# Patient Record
Sex: MALE | Race: WHITE | NOT HISPANIC OR LATINO
[De-identification: names, ages, dates, MRNs, and addresses within clinical notes are randomized per-mention and may not be internally consistent; named-entity substitution may affect disease eponyms.]

---

## 2018-12-27 ENCOUNTER — NON-APPOINTMENT (OUTPATIENT)
Age: 58
End: 2018-12-27

## 2018-12-27 ENCOUNTER — APPOINTMENT (OUTPATIENT)
Dept: CARDIOLOGY | Facility: CLINIC | Age: 58
End: 2018-12-27
Payer: COMMERCIAL

## 2018-12-27 VITALS
WEIGHT: 280 LBS | HEART RATE: 57 BPM | SYSTOLIC BLOOD PRESSURE: 108 MMHG | HEIGHT: 70 IN | DIASTOLIC BLOOD PRESSURE: 60 MMHG | BODY MASS INDEX: 40.09 KG/M2

## 2018-12-27 DIAGNOSIS — Q07.00 ARNOLD-CHIARI SYNDROME W/OUT SPINA BIFIDA OR HYDROCEPHALUS: ICD-10-CM

## 2018-12-27 DIAGNOSIS — Z86.79 PERSONAL HISTORY OF OTHER DISEASES OF THE CIRCULATORY SYSTEM: ICD-10-CM

## 2018-12-27 DIAGNOSIS — Z87.898 PERSONAL HISTORY OF OTHER SPECIFIED CONDITIONS: ICD-10-CM

## 2018-12-27 DIAGNOSIS — Z86.69 PERSONAL HISTORY OF OTHER DISEASES OF THE NERVOUS SYSTEM AND SENSE ORGANS: ICD-10-CM

## 2018-12-27 DIAGNOSIS — Z87.891 PERSONAL HISTORY OF NICOTINE DEPENDENCE: ICD-10-CM

## 2018-12-27 DIAGNOSIS — G62.9 POLYNEUROPATHY, UNSPECIFIED: ICD-10-CM

## 2018-12-27 PROCEDURE — 99243 OFF/OP CNSLTJ NEW/EST LOW 30: CPT

## 2018-12-27 RX ORDER — ASPIRIN 81 MG
81 TABLET, DELAYED RELEASE (ENTERIC COATED) ORAL
Refills: 0 | Status: ACTIVE | COMMUNITY

## 2018-12-27 NOTE — REVIEW OF SYSTEMS
[Dyspnea on exertion] : dyspnea during exertion [Lower Ext Edema] : lower extremity edema [Negative] : Heme/Lymph

## 2018-12-27 NOTE — DISCUSSION/SUMMARY
[FreeTextEntry1] : Kory is a 58-year-old male with medical history detailed above and active medical issues including:\par \par - Preop evaluation abdominal hernia repair Dr Patel 1/14/19 [facility].  Preop will be completed after further noninvasive testing.\par \par - Patient has dyspnea with moderate exertion, atypical chest pain, abnormal baseline EKG, multiple CAD risk factors. Patient will have noninvasive testing with a exercise Myoview stress test to assess for obstructive CAD, exercise-induced arrhythmia,  blood pressure response, 2DEcho for LVEF, wall motion, structural heart disease,  carotid and abdominal ultrasound to assess for obstructive PAD. \par \par - Dependant leg edema, unknown LVEF. Patient instructed to maintain low salt diet, elevate legs when nonambulatory, knee-high compression stockings during the day and with prolonged travel.\par \par - History of left rotator cuff surgery 2015, right shoulder bursitis pain.\par \par - Obesity. Discussed calorie reduction and increased exercise as a weight reduction strategy.\par \par - Mood disorder on Xanax, Cymbalta\par \par - History of alcoholism discontinued 1994, substance abuse cocaine discontinued 1989\par \par - Family history of stroke brother age 52\par \par - History of Arnold Chiari malformation cranial surgery 1999\par \par Patient will be seen in cardiology follow-up after noninvasive testing to complete preop clearance.\par \par Advised patient to follow active lifestyle with regular cardiovascular exercise. Patient educated on lifestyle and diet modification with low sodium low fat diet and avoidance of excessive alcohol. Patient is aware to call with any symptoms or concerns. \par \par Patient currently not on cardiac medication. Repeat labs will be ordered with PMD.\par \par Kory will followup with George FIGUEREDO for primary care\par \par

## 2018-12-27 NOTE — REASON FOR VISIT
[Consultation] : a consultation regarding [FreeTextEntry2] : preop abdominal hernia repair Dr Patel 1/14/19 [facility] [FreeTextEntry1] : Kory is a 58-year-old male with history of obesity, asthma, anxiety, alcoholism quit in 1994, substance abuse cocaine quit 1984, neuropathy, mood disorder, dependant leg edema, migraine, Arnold Chiari malformation cranial surgery 2003, rotator cuff surgery 2015, hernia surgery 2003. \par \par Patient has dyspnea with moderate exertion.  Patient has chronic dependant leg edema.  Cardiovascular review of symptoms is negative for exertional chest pain, palpitations, dizziness or syncope.  No PND or orthopnea.  No bleeding or black stool. Patient has chronic right shoulder bursitis pain increasing over the past month.  \par \par Patient is walking his dog 15 minutes with exertional chest pain.  Patient states he is physically active working as a .  No prior cardiology evaluation or stress testing. \par \par EKG December 2018, sinus rhythm, PACs, PRWP\par

## 2019-01-03 ENCOUNTER — OUTPATIENT (OUTPATIENT)
Dept: OUTPATIENT SERVICES | Facility: HOSPITAL | Age: 59
LOS: 1 days | End: 2019-01-03

## 2019-01-07 ENCOUNTER — APPOINTMENT (OUTPATIENT)
Dept: CARDIOLOGY | Facility: CLINIC | Age: 59
End: 2019-01-07
Payer: COMMERCIAL

## 2019-01-07 PROCEDURE — 93880 EXTRACRANIAL BILAT STUDY: CPT

## 2019-01-07 PROCEDURE — 93306 TTE W/DOPPLER COMPLETE: CPT

## 2019-01-07 PROCEDURE — 93979 VASCULAR STUDY: CPT

## 2019-01-08 ENCOUNTER — APPOINTMENT (OUTPATIENT)
Dept: CARDIOLOGY | Facility: CLINIC | Age: 59
End: 2019-01-08
Payer: COMMERCIAL

## 2019-01-08 DIAGNOSIS — Z82.0 FAMILY HISTORY OF EPILEPSY AND OTHER DISEASES OF THE NERVOUS SYSTEM: ICD-10-CM

## 2019-01-08 DIAGNOSIS — Z87.898 PERSONAL HISTORY OF OTHER SPECIFIED CONDITIONS: ICD-10-CM

## 2019-01-08 DIAGNOSIS — Z81.8 FAMILY HISTORY OF OTHER MENTAL AND BEHAVIORAL DISORDERS: ICD-10-CM

## 2019-01-08 DIAGNOSIS — Z82.5 FAMILY HISTORY OF ASTHMA AND OTHER CHRONIC LOWER RESPIRATORY DISEASES: ICD-10-CM

## 2019-01-08 DIAGNOSIS — K21.9 GASTRO-ESOPHAGEAL REFLUX DISEASE W/OUT ESOPHAGITIS: ICD-10-CM

## 2019-01-08 PROCEDURE — A9502: CPT

## 2019-01-08 PROCEDURE — 93015 CV STRESS TEST SUPVJ I&R: CPT

## 2019-01-08 PROCEDURE — 78452 HT MUSCLE IMAGE SPECT MULT: CPT

## 2019-01-08 RX ORDER — ASCORBIC ACID 500 MG
500 TABLET ORAL
Refills: 0 | Status: ACTIVE | COMMUNITY

## 2019-01-08 RX ORDER — OMEPRAZOLE 20 MG/1
20 CAPSULE, DELAYED RELEASE ORAL
Refills: 0 | Status: DISCONTINUED | COMMUNITY
End: 2019-01-08

## 2019-01-09 ENCOUNTER — APPOINTMENT (OUTPATIENT)
Dept: CARDIOLOGY | Facility: CLINIC | Age: 59
End: 2019-01-09
Payer: COMMERCIAL

## 2019-01-09 VITALS
DIASTOLIC BLOOD PRESSURE: 70 MMHG | BODY MASS INDEX: 40.09 KG/M2 | SYSTOLIC BLOOD PRESSURE: 120 MMHG | HEIGHT: 70 IN | OXYGEN SATURATION: 98 % | WEIGHT: 280 LBS | HEART RATE: 64 BPM

## 2019-01-09 DIAGNOSIS — Z01.812 ENCOUNTER FOR PREPROCEDURAL LABORATORY EXAMINATION: ICD-10-CM

## 2019-01-09 PROCEDURE — 99215 OFFICE O/P EST HI 40 MIN: CPT

## 2019-01-09 PROCEDURE — 93224 XTRNL ECG REC UP TO 48 HRS: CPT

## 2019-01-10 ENCOUNTER — OUTPATIENT (OUTPATIENT)
Dept: OUTPATIENT SERVICES | Facility: HOSPITAL | Age: 59
LOS: 1 days | End: 2019-01-10

## 2019-01-15 ENCOUNTER — OUTPATIENT (OUTPATIENT)
Dept: OUTPATIENT SERVICES | Facility: HOSPITAL | Age: 59
LOS: 1 days | End: 2019-01-15
Payer: COMMERCIAL

## 2019-01-15 PROCEDURE — 93458 L HRT ARTERY/VENTRICLE ANGIO: CPT | Mod: 26

## 2019-01-17 ENCOUNTER — APPOINTMENT (OUTPATIENT)
Dept: CARDIOLOGY | Facility: CLINIC | Age: 59
End: 2019-01-17
Payer: COMMERCIAL

## 2019-01-17 VITALS
BODY MASS INDEX: 39.8 KG/M2 | SYSTOLIC BLOOD PRESSURE: 110 MMHG | HEIGHT: 70 IN | HEART RATE: 61 BPM | OXYGEN SATURATION: 96 % | WEIGHT: 278 LBS | DIASTOLIC BLOOD PRESSURE: 68 MMHG

## 2019-01-17 PROCEDURE — 99214 OFFICE O/P EST MOD 30 MIN: CPT

## 2019-02-11 ENCOUNTER — OUTPATIENT (OUTPATIENT)
Dept: OUTPATIENT SERVICES | Facility: HOSPITAL | Age: 59
LOS: 1 days | End: 2019-02-11

## 2019-07-16 ENCOUNTER — APPOINTMENT (OUTPATIENT)
Dept: CARDIOLOGY | Facility: CLINIC | Age: 59
End: 2019-07-16
Payer: COMMERCIAL

## 2019-07-16 ENCOUNTER — NON-APPOINTMENT (OUTPATIENT)
Age: 59
End: 2019-07-16

## 2019-07-16 VITALS
WEIGHT: 270 LBS | HEIGHT: 71 IN | OXYGEN SATURATION: 98 % | DIASTOLIC BLOOD PRESSURE: 66 MMHG | SYSTOLIC BLOOD PRESSURE: 112 MMHG | BODY MASS INDEX: 37.8 KG/M2 | HEART RATE: 70 BPM

## 2019-07-16 PROCEDURE — 99214 OFFICE O/P EST MOD 30 MIN: CPT

## 2019-07-16 PROCEDURE — 93000 ELECTROCARDIOGRAM COMPLETE: CPT

## 2019-07-16 NOTE — DISCUSSION/SUMMARY
[FreeTextEntry1] : Kory is a 58-year-old male with medical history detailed above and active medical issues including:\par \par - No anginal symptoms, abnormal Myoview stress test, nonobstructive catheterization January 2019\par \par - Asymptomatic atrial tachycardia no AFL seen on Holter monitor January 2019.  Repeat Holter applied today.  Medication noncompliance, restart Toprol today in followup home BP prior to dose.\par \par - Dependant leg edema, unknown LVEF. Patient instructed to maintain low salt diet, elevate legs when nonambulatory, knee-high compression stockings during the day and with prolonged travel.\par \par - History of left rotator cuff surgery 2015, right shoulder bursitis pain.\par \par - Obesity. Discussed calorie reduction and increased exercise as a weight reduction strategy.\par \par - Mood disorder on Xanax, Cymbalta\par \par - History of alcoholism discontinued 1994, substance abuse cocaine discontinued 1989\par \par - Family history of stroke brother age 52\par \par - History of Arnold Chiari malformation cranial surgery 1999\par \par Patient will be seen in cardiology follow-up 3 months. \par \par Advised patient to follow active lifestyle with regular cardiovascular exercise. Patient educated on lifestyle and diet modification with low sodium low fat diet and avoidance of excessive alcohol. Patient is aware to call with any symptoms or concerns. \par \par Kory will followup with George FIGUEREDO for primary care\par \par

## 2019-07-16 NOTE — PHYSICAL EXAM
[General Appearance - Well Developed] : well developed [Normal Appearance] : normal appearance [Well Groomed] : well groomed [General Appearance - Well Nourished] : well nourished [No Deformities] : no deformities [General Appearance - In No Acute Distress] : no acute distress [FreeTextEntry1] : pleasant moderately obese middle-aged male [Normal Conjunctiva] : the conjunctiva exhibited no abnormalities [Eyelids - No Xanthelasma] : the eyelids demonstrated no xanthelasmas [Normal Oral Mucosa] : normal oral mucosa [No Oral Pallor] : no oral pallor [No Oral Cyanosis] : no oral cyanosis [Normal Jugular Venous A Waves Present] : normal jugular venous A waves present [Normal Jugular Venous V Waves Present] : normal jugular venous V waves present [No Jugular Venous Kuo A Waves] : no jugular venous kuo A waves [Respiration, Rhythm And Depth] : normal respiratory rhythm and effort [Exaggerated Use Of Accessory Muscles For Inspiration] : no accessory muscle use [Auscultation Breath Sounds / Voice Sounds] : lungs were clear to auscultation bilaterally [Heart Rate And Rhythm] : heart rate and rhythm were normal [Heart Sounds] : normal S1 and S2 [Murmurs] : no murmurs present [Abdomen Soft] : soft [Abdomen Tenderness] : non-tender [Abdomen Mass (___ Cm)] : no abdominal mass palpated [Abnormal Walk] : normal gait [Gait - Sufficient For Exercise Testing] : the gait was sufficient for exercise testing [Nail Clubbing] : no clubbing of the fingernails [Cyanosis, Localized] : no localized cyanosis [Petechial Hemorrhages (___cm)] : no petechial hemorrhages [Skin Color & Pigmentation] : normal skin color and pigmentation [] : no rash [No Venous Stasis] : no venous stasis [Skin Lesions] : no skin lesions [No Skin Ulcers] : no skin ulcer [No Xanthoma] : no  xanthoma was observed [Oriented To Time, Place, And Person] : oriented to person, place, and time [Affect] : the affect was normal [Mood] : the mood was normal [No Anxiety] : not feeling anxious

## 2019-07-16 NOTE — REASON FOR VISIT
[Consultation] : a consultation regarding [FreeTextEntry2] : asymptomatic atrial tachycardia, abnormal myoview stess test with nonobstructive cath Jan 2019  [FreeTextEntry1] : Kory is a 58-year-old male with history of obesity, asthma, anxiety, alcoholism quit in 1994, substance abuse cocaine quit 1984, neuropathy, mood disorder, dependant leg edema, migraine, Arnold Chiari malformation cranial surgery 2003, rotator cuff surgery 2015, hernia surgery 2003. \par \par Patient states he ran out of Toprol several days ago.  Stressed the importance of medication compliance.  Patient will restart Toprol and check home BP prior to dose. \par \par Patient has occasional palpitations racing heartbeat once or twice per month. Patient has dyspnea with moderate exertion.  Patient has chronic dependant leg edema.  Cardiovascular review of symptoms is negative for exertional chest pain, dizziness or syncope.  No PND or orthopnea.  No bleeding or black stool. Patient has chronic right shoulder bursitis pain increasing over the past month.  \par \par Patient is walking his dog 15 minutes with exertional chest pain.  Patient states he is physically active working as a .  No prior cardiology evaluation or stress testing. \par \par Cardiac catheterization January 2019, nonobstructive coronaries, no left ventriculogram\par \par Lexascan Myoview stress test January 2019, LVEF 50%, mild to moderate apical ischemic defect, transient ischemic LV dilatation seen, new a flutter/tachycardia at baseline. \par \par Holter monitor January 2019, sinus rhythm  average heart rate 67bpm, rare PACs and PVCs, atrial tachycardia lasting 15 minutes, no symptoms\par \par Echocardiogram January 2019, LVEF 65%, mild diastolic dysfunction, normal PASP, aneurysmal interatrial septum\par \par Carotid and abdominal ultrasound January 2019, nonobstructive torturous carotid arteries, normal abdominal aortic size\par \par EKG December 2018, sinus rhythm, PACs, PRWP\par

## 2019-07-18 PROCEDURE — 93224 XTRNL ECG REC UP TO 48 HRS: CPT

## 2019-09-03 ENCOUNTER — MEDICATION RENEWAL (OUTPATIENT)
Age: 59
End: 2019-09-03

## 2019-10-01 ENCOUNTER — APPOINTMENT (OUTPATIENT)
Dept: CARDIOLOGY | Facility: CLINIC | Age: 59
End: 2019-10-01
Payer: COMMERCIAL

## 2019-10-01 VITALS
SYSTOLIC BLOOD PRESSURE: 130 MMHG | BODY MASS INDEX: 37.8 KG/M2 | HEART RATE: 62 BPM | HEIGHT: 71 IN | OXYGEN SATURATION: 93 % | DIASTOLIC BLOOD PRESSURE: 70 MMHG | WEIGHT: 270 LBS

## 2019-10-01 PROCEDURE — 99214 OFFICE O/P EST MOD 30 MIN: CPT

## 2019-10-01 NOTE — PHYSICAL EXAM
[General Appearance - Well Developed] : well developed [Normal Appearance] : normal appearance [General Appearance - Well Nourished] : well nourished [Well Groomed] : well groomed [General Appearance - In No Acute Distress] : no acute distress [No Deformities] : no deformities [Normal Conjunctiva] : the conjunctiva exhibited no abnormalities [Eyelids - No Xanthelasma] : the eyelids demonstrated no xanthelasmas [Normal Oral Mucosa] : normal oral mucosa [No Oral Pallor] : no oral pallor [No Oral Cyanosis] : no oral cyanosis [Normal Jugular Venous A Waves Present] : normal jugular venous A waves present [Normal Jugular Venous V Waves Present] : normal jugular venous V waves present [No Jugular Venous Kuo A Waves] : no jugular venous kuo A waves [Respiration, Rhythm And Depth] : normal respiratory rhythm and effort [Exaggerated Use Of Accessory Muscles For Inspiration] : no accessory muscle use [Auscultation Breath Sounds / Voice Sounds] : lungs were clear to auscultation bilaterally [Heart Rate And Rhythm] : heart rate and rhythm were normal [Heart Sounds] : normal S1 and S2 [Murmurs] : no murmurs present [Abdomen Soft] : soft [Abdomen Tenderness] : non-tender [Abdomen Mass (___ Cm)] : no abdominal mass palpated [Abnormal Walk] : normal gait [Gait - Sufficient For Exercise Testing] : the gait was sufficient for exercise testing [Cyanosis, Localized] : no localized cyanosis [Nail Clubbing] : no clubbing of the fingernails [Petechial Hemorrhages (___cm)] : no petechial hemorrhages [] : no rash [Skin Color & Pigmentation] : normal skin color and pigmentation [No Venous Stasis] : no venous stasis [Skin Lesions] : no skin lesions [No Skin Ulcers] : no skin ulcer [No Xanthoma] : no  xanthoma was observed [Oriented To Time, Place, And Person] : oriented to person, place, and time [Affect] : the affect was normal [Mood] : the mood was normal [No Anxiety] : not feeling anxious [FreeTextEntry1] : pleasant moderately obese middle-aged male

## 2019-10-01 NOTE — REASON FOR VISIT
[Consultation] : a consultation regarding [FreeTextEntry2] : asymptomatic atrial tachycardia, abnormal myoview stess test with nonobstructive cath Jan 2019  [FreeTextEntry1] : Kory is a 58-year-old male with history of obesity, asthma, anxiety, alcoholism quit in 1994, substance abuse cocaine quit 1984, neuropathy, mood disorder, dependant leg edema, migraine, Arnold Chiari malformation cranial surgery 2003, rotator cuff surgery 2015, hernia surgery 2003. \par \par Patient states he ran out of Toprol several days ago.  Stressed the importance of medication compliance.  Patient will restart Toprol and check home BP prior to dose. \par \par Patient has occasional palpitations racing heartbeat once or twice per month. Patient has dyspnea with moderate exertion.  Patient has chronic dependant leg edema.  Cardiovascular review of symptoms is negative for exertional chest pain, dizziness or syncope.  No PND or orthopnea.  No bleeding or black stool. Patient has chronic right shoulder bursitis pain increasing over the past month.  \par \par Patient has chest wall tightness, muscle contraction associated with emotional stress relieved with deep inspiration. \par \par Patient is walking his dog 15 minutes with exertional chest pain.  Patient states he is physically active working as a .  No prior cardiology evaluation or stress testing. \par \par Cardiac catheterization January 2019, nonobstructive coronaries, no left ventriculogram\par \par Lexascan Myoview stress test January 2019, LVEF 50%, mild to moderate apical ischemic defect, transient ischemic LV dilatation seen, new a flutter/tachycardia at baseline. \par \par Holter monitor January 2019, sinus rhythm  average heart rate 67bpm, rare PACs and PVCs, atrial tachycardia lasting 15 minutes, no symptoms\par \par Echocardiogram January 2019, LVEF 65%, mild diastolic dysfunction, normal PASP, aneurysmal interatrial septum\par \par Carotid and abdominal ultrasound January 2019, nonobstructive torturous carotid arteries, normal abdominal aortic size\par \par EKG December 2018, sinus rhythm, PACs, PRWP\par

## 2019-10-01 NOTE — DISCUSSION/SUMMARY
[FreeTextEntry1] : Kory is a 58-year-old male with medical history detailed above and active medical issues including:\par \par - No anginal symptoms, abnormal Myoview stress test, nonobstructive catheterization January 2019\par \par - Patient has chest wall tightness, muscle contraction associated with emotional stress relieved with deep inspiration. \par \par - Asymptomatic atrial tachycardia no AFL seen on Holter monitor January 2019.  Repeat Holter applied today.  Medication noncompliance, restart Toprol today in followup home BP prior to dose.\par \par - Dependant leg edema, unknown LVEF. Patient instructed to maintain low salt diet, elevate legs when nonambulatory, knee-high compression stockings during the day and with prolonged travel.\par \par - History of left rotator cuff surgery 2015, right shoulder bursitis pain.\par \par - Obesity. Discussed calorie reduction and increased exercise as a weight reduction strategy.\par \par - Mood disorder on Xanax, Cymbalta\par \par - History of alcoholism discontinued 1994, substance abuse cocaine discontinued 1989\par \par - Family history of stroke brother age 52\par \par - History of Arnold Chiari malformation cranial surgery 1999\par \par Patient will be seen in cardiology follow-up 3 months. \par \par Advised patient to follow active lifestyle with regular cardiovascular exercise. Patient educated on lifestyle and diet modification with low sodium low fat diet and avoidance of excessive alcohol. Patient is aware to call with any symptoms or concerns. \par \par Kory will followup with George FIGUEREDO for primary care\par \par

## 2019-10-01 NOTE — REVIEW OF SYSTEMS
[Negative] : Heme/Lymph [Dyspnea on exertion] : not dyspnea during exertion [Muscle Cramps] : muscle cramps [Lower Ext Edema] : no extremity edema

## 2020-01-07 ENCOUNTER — RX RENEWAL (OUTPATIENT)
Age: 60
End: 2020-01-07

## 2020-04-16 ENCOUNTER — APPOINTMENT (OUTPATIENT)
Dept: CARDIOLOGY | Facility: CLINIC | Age: 60
End: 2020-04-16

## 2020-07-01 ENCOUNTER — RX RENEWAL (OUTPATIENT)
Age: 60
End: 2020-07-01

## 2020-11-02 ENCOUNTER — RX RENEWAL (OUTPATIENT)
Age: 60
End: 2020-11-02

## 2021-02-11 ENCOUNTER — APPOINTMENT (OUTPATIENT)
Dept: CARDIOLOGY | Facility: CLINIC | Age: 61
End: 2021-02-11
Payer: COMMERCIAL

## 2021-02-11 VITALS
DIASTOLIC BLOOD PRESSURE: 62 MMHG | OXYGEN SATURATION: 95 % | HEIGHT: 71 IN | SYSTOLIC BLOOD PRESSURE: 118 MMHG | WEIGHT: 280 LBS | BODY MASS INDEX: 39.2 KG/M2 | HEART RATE: 58 BPM

## 2021-02-11 DIAGNOSIS — E66.8 OTHER OBESITY: ICD-10-CM

## 2021-02-11 PROCEDURE — 99072 ADDL SUPL MATRL&STAF TM PHE: CPT

## 2021-02-11 PROCEDURE — 93246 EXT ECG>7D<15D RECORDING: CPT

## 2021-02-11 PROCEDURE — 99214 OFFICE O/P EST MOD 30 MIN: CPT

## 2021-02-11 NOTE — ASSESSMENT
[FreeTextEntry1] : Recurrent palpitations, dyspnea on exertion, multiple CAD risk factors.  Patient will have noninvasive testing with exercise stress echo to assess for obstructive CAD, HR and BP response, exercise-induced arrhythmia, echocardiogram for LVEF, structural heart disease, carotid and abdominal ultrasound to assess for obstructive PAD.  Zio patch 2-week heart monitor started today.  Labs ordered.\par \par - Abnormal Myoview stress test with nonobstructive catheterization January 2019\par \par -Dependent leg edema\par \par -Obesity.  Discussed calorie reduction and increased exercise as a weight reduction strategy.\par \par -Remote history of substance abuse.  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 \par \par Advised patient to follow active lifestyle with regular cardiovascular exercise. Patient educated on lifestyle and diet modification with low sodium low fat diet and avoidance of excessive alcohol. Patient is aware to call with any symptoms or concerns.\par \par Patient will be seen in cardiology follow-up after noninvasive testing.\par \par Kory  follow-up will follow up with George Zhou NP for primary care

## 2021-02-11 NOTE — REVIEW OF SYSTEMS
[Muscle Cramps] : muscle cramps [Negative] : Heme/Lymph [Dyspnea on exertion] : not dyspnea during exertion [Lower Ext Edema] : no extremity edema

## 2021-02-11 NOTE — PHYSICAL EXAM
[General Appearance - Well Developed] : well developed [Normal Appearance] : normal appearance [Well Groomed] : well groomed [General Appearance - Well Nourished] : well nourished [No Deformities] : no deformities [General Appearance - In No Acute Distress] : no acute distress [Normal Conjunctiva] : the conjunctiva exhibited no abnormalities [Eyelids - No Xanthelasma] : the eyelids demonstrated no xanthelasmas [Normal Oral Mucosa] : normal oral mucosa [No Oral Pallor] : no oral pallor [No Oral Cyanosis] : no oral cyanosis [Normal Jugular Venous A Waves Present] : normal jugular venous A waves present [Normal Jugular Venous V Waves Present] : normal jugular venous V waves present [No Jugular Venous Kuo A Waves] : no jugular venous kuo A waves [Respiration, Rhythm And Depth] : normal respiratory rhythm and effort [Exaggerated Use Of Accessory Muscles For Inspiration] : no accessory muscle use [Auscultation Breath Sounds / Voice Sounds] : lungs were clear to auscultation bilaterally [Heart Rate And Rhythm] : heart rate and rhythm were normal [Heart Sounds] : normal S1 and S2 [Murmurs] : no murmurs present [Abdomen Soft] : soft [Abdomen Tenderness] : non-tender [Abdomen Mass (___ Cm)] : no abdominal mass palpated [Abnormal Walk] : normal gait [Gait - Sufficient For Exercise Testing] : the gait was sufficient for exercise testing [Nail Clubbing] : no clubbing of the fingernails [Cyanosis, Localized] : no localized cyanosis [Petechial Hemorrhages (___cm)] : no petechial hemorrhages [Skin Color & Pigmentation] : normal skin color and pigmentation [] : no rash [No Venous Stasis] : no venous stasis [Skin Lesions] : no skin lesions [No Skin Ulcers] : no skin ulcer [No Xanthoma] : no  xanthoma was observed [Oriented To Time, Place, And Person] : oriented to person, place, and time [Affect] : the affect was normal [Mood] : the mood was normal [No Anxiety] : not feeling anxious [FreeTextEntry1] : pleasant moderately obese middle-aged male

## 2021-02-11 NOTE — REASON FOR VISIT
[Follow-Up - Clinic] : a clinic follow-up of [FreeTextEntry2] : palpitations,  atrial tachycardia, abnormal myoview stess test with nonobstructive cath Jan 2019  [FreeTextEntry1] : Kory is a 60-year-old male with history of obesity, asthma, anxiety, alcoholism quit in 1994, substance abuse cocaine quit 1984, neuropathy, mood disorder, dependant leg edema, migraine, Arnold Chiari malformation cranial surgery 2003, rotator cuff surgery 2015, hernia surgery 2003.  \par \par Patient has recurrent palpitations racing heartbeat once or twice per week. Patient has dyspnea with moderate exertion.  Patient has chronic dependant leg edema.  Cardiovascular review of symptoms is negative for exertional chest pain, dizziness or syncope.  No PND or orthopnea.  No bleeding or black stool. Patient has chronic right shoulder bursitis pain increasing over the past month.  \par \par Patient has chest wall tightness, muscle contraction associated with emotional stress relieved with deep inspiration. \par \par No exercise routine.  Patient is walking his dog 15 minutes.  \par \par Cardiac catheterization January 2019, nonobstructive coronaries, no left ventriculogram\par \par Lexascan Myoview stress test January 2019, LVEF 50%, mild to moderate apical ischemic defect, transient ischemic LV dilatation seen, new a flutter/tachycardia at baseline. \par \par Holter monitor January 2019, sinus rhythm  average heart rate 67bpm, rare PACs and PVCs, atrial tachycardia lasting 15 minutes, no symptoms\par \par Echocardiogram January 2019, LVEF 65%, mild diastolic dysfunction, normal PASP, aneurysmal interatrial septum\par \par Carotid and abdominal ultrasound January 2019, nonobstructive torturous carotid arteries, normal abdominal aortic size\par \par EKG December 2018, sinus rhythm, PACs, PRWP\par

## 2021-02-26 ENCOUNTER — APPOINTMENT (OUTPATIENT)
Dept: CARDIOLOGY | Facility: CLINIC | Age: 61
End: 2021-02-26
Payer: COMMERCIAL

## 2021-02-26 PROCEDURE — 93880 EXTRACRANIAL BILAT STUDY: CPT

## 2021-02-26 PROCEDURE — 93306 TTE W/DOPPLER COMPLETE: CPT

## 2021-02-26 PROCEDURE — 93979 VASCULAR STUDY: CPT

## 2021-02-26 PROCEDURE — 99072 ADDL SUPL MATRL&STAF TM PHE: CPT

## 2021-03-09 ENCOUNTER — NON-APPOINTMENT (OUTPATIENT)
Age: 61
End: 2021-03-09

## 2021-03-09 PROCEDURE — 93248 EXT ECG>7D<15D REV&INTERPJ: CPT

## 2021-03-09 PROCEDURE — 99072 ADDL SUPL MATRL&STAF TM PHE: CPT

## 2021-03-12 ENCOUNTER — NON-APPOINTMENT (OUTPATIENT)
Age: 61
End: 2021-03-12

## 2021-03-12 ENCOUNTER — APPOINTMENT (OUTPATIENT)
Dept: ELECTROPHYSIOLOGY | Facility: CLINIC | Age: 61
End: 2021-03-12
Payer: COMMERCIAL

## 2021-03-12 VITALS
HEIGHT: 71 IN | OXYGEN SATURATION: 98 % | SYSTOLIC BLOOD PRESSURE: 122 MMHG | TEMPERATURE: 98.2 F | HEART RATE: 66 BPM | BODY MASS INDEX: 40.46 KG/M2 | WEIGHT: 289 LBS | DIASTOLIC BLOOD PRESSURE: 64 MMHG

## 2021-03-12 PROCEDURE — 99203 OFFICE O/P NEW LOW 30 MIN: CPT | Mod: 25

## 2021-03-12 PROCEDURE — 93000 ELECTROCARDIOGRAM COMPLETE: CPT

## 2021-03-12 PROCEDURE — 99072 ADDL SUPL MATRL&STAF TM PHE: CPT

## 2021-03-12 RX ORDER — DULOXETINE HYDROCHLORIDE 30 MG/1
30 CAPSULE, DELAYED RELEASE ORAL
Refills: 0 | Status: DISCONTINUED | COMMUNITY
End: 2021-03-12

## 2021-03-23 NOTE — DISCUSSION/SUMMARY
[FreeTextEntry1] : The patient has had a long history of palpitations.  His Zio patch monitor shows PVCs, short runs of NSVT and disorganized atrial rhythm atrial tach/fib.\par PVCs: different morphologies - Right bundle superior axis\par NSVT: 2 episodes 4 beat 160 bpm: Echo EF 60%. Nonobstructive coronaries in 2019. Recommend Beta blocker therapy.\par \par SVT: Appears to a disorganized AT - can degenerate to AF.  [id number] score 0. \par \par Unclear if his symptoms related to the SVT or PVCs/NSVT - but more like related to the SVT/AT - ? multifocal\par \par Treatment options discussed: catheter ablation of AT vs medications. He does not want ablation now and would recommend increase metoprolol to bid. If on the higher dosage and after lifestyle adjustments he still has recurrent symptoms will then re discuss catheter ablation. \par

## 2021-03-23 NOTE — PHYSICAL EXAM
[Normal Conjunctiva] : the conjunctiva exhibited no abnormalities [Normal Jugular Venous V Waves Present] : normal jugular venous V waves present [Heart Rate And Rhythm] : heart rate and rhythm were normal [Heart Sounds] : normal S1 and S2 [Murmurs] : no murmurs present [Arterial Pulses Normal] : the arterial pulses were normal [Edema] : no peripheral edema present [Auscultation Breath Sounds / Voice Sounds] : lungs were clear to auscultation bilaterally [Abdomen Soft] : soft [Abdomen Tenderness] : non-tender [Abnormal Walk] : normal gait [FreeTextEntry1] : trace edema [No Venous Stasis] : no venous stasis [Impaired Insight] : insight and judgment were intact

## 2021-03-23 NOTE — REVIEW OF SYSTEMS
[Feeling Fatigued] : not feeling fatigued [Shortness Of Breath] : no shortness of breath [Chest Pain] : no chest pain [Palpitations] : palpitations [Cough] : no cough [Abdominal Pain] : no abdominal pain [Dizziness] : no dizziness [Easy Bleeding] : no tendency for easy bleeding [Easy Bruising] : no tendency for easy bruising

## 2021-03-23 NOTE — HISTORY OF PRESENT ILLNESS
[FreeTextEntry1] : Patient is a 58-year-old man who was seen in evaluation for palpitations.  He has had the symptoms for a few years.  The episodes last a few minutes.  It sometimes associated with tightness in chest or difficulty with deep breathing.  There are no clear trigger to these episodes.  He does not consume much limited caffeine or alcohol.  He is a recovering substance abuser.\par \par He had a Zio patch recorder placed between 2/11/2021 and 2/25/2021: It showed sinus rhythm with heart rates ranging between 46 to 176 bpm.  He had 2 runs of NSVT 4 beats in duration.  Patient had short episodes of SVT (atrial tachycardia.  The longest episode was 1 minute 33 seconds.\par \par Echocardiogram performed on 2/26/2021 showed EF 60%, mild mitral regurgitation, sclerotic aortic valve leaflet with normal opening, left atrial diameter 4.2 cm, left atrial volume index 25 cc/m², atrial septal aneurysm, moderate concentric left ventricular hypertrophy no pericardial effusion and normal pulmonary pressures.\par \par Previous nuclear stress test performed January 2019 did not show ischemia.  Nonobstructive coronaries in 2019.\par \par He has a   [id number] score 0.\par \par No HTN, DM, thyroid disease. \par Denies alcohol, excessive caffeine, nicotine. \par \par \par \par \par \par

## 2021-03-25 ENCOUNTER — APPOINTMENT (OUTPATIENT)
Dept: CARDIOLOGY | Facility: CLINIC | Age: 61
End: 2021-03-25

## 2021-04-19 ENCOUNTER — APPOINTMENT (OUTPATIENT)
Dept: ELECTROPHYSIOLOGY | Facility: CLINIC | Age: 61
End: 2021-04-19

## 2021-04-19 ENCOUNTER — RX CHANGE (OUTPATIENT)
Age: 61
End: 2021-04-19

## 2021-04-29 ENCOUNTER — APPOINTMENT (OUTPATIENT)
Dept: CARDIOLOGY | Facility: CLINIC | Age: 61
End: 2021-04-29
Payer: COMMERCIAL

## 2021-04-29 PROCEDURE — 93351 STRESS TTE COMPLETE: CPT

## 2021-04-29 PROCEDURE — 99072 ADDL SUPL MATRL&STAF TM PHE: CPT

## 2021-04-30 ENCOUNTER — APPOINTMENT (OUTPATIENT)
Dept: CARDIOLOGY | Facility: CLINIC | Age: 61
End: 2021-04-30

## 2021-05-12 ENCOUNTER — OUTPATIENT (OUTPATIENT)
Dept: INPATIENT UNIT | Facility: HOSPITAL | Age: 61
LOS: 1 days | End: 2021-05-12
Payer: COMMERCIAL

## 2021-05-12 PROCEDURE — 93010 ELECTROCARDIOGRAM REPORT: CPT

## 2021-05-12 PROCEDURE — 99245 OFF/OP CONSLTJ NEW/EST HI 55: CPT | Mod: 25

## 2021-05-12 PROCEDURE — 93458 L HRT ARTERY/VENTRICLE ANGIO: CPT | Mod: 26

## 2021-05-18 ENCOUNTER — APPOINTMENT (OUTPATIENT)
Dept: CARDIOLOGY | Facility: CLINIC | Age: 61
End: 2021-05-18
Payer: COMMERCIAL

## 2021-05-18 VITALS
BODY MASS INDEX: 40.6 KG/M2 | SYSTOLIC BLOOD PRESSURE: 130 MMHG | DIASTOLIC BLOOD PRESSURE: 72 MMHG | OXYGEN SATURATION: 95 % | WEIGHT: 290 LBS | TEMPERATURE: 97.5 F | HEART RATE: 61 BPM | HEIGHT: 71 IN

## 2021-05-18 DIAGNOSIS — Z00.00 ENCOUNTER FOR GENERAL ADULT MEDICAL EXAMINATION W/OUT ABNORMAL FINDINGS: ICD-10-CM

## 2021-05-18 PROCEDURE — 99072 ADDL SUPL MATRL&STAF TM PHE: CPT

## 2021-05-18 PROCEDURE — 99214 OFFICE O/P EST MOD 30 MIN: CPT

## 2021-05-18 NOTE — DISCUSSION/SUMMARY
[FreeTextEntry1] : The patient has had a long history of palpitations.  His Zio patch monitor shows PVCs, short runs of NSVT and disorganized atrial rhythm atrial tach/fib.\par PVCs: different morphologies - Right bundle superior axis\par NSVT: 2 episodes 4 beat 160 bpm: Echo EF 60%. Nonobstructive coronaries in 2019. Recommend Beta blocker therapy.\par \par SVT: Appears to a disorganized AT - can degenerate to AF.  [id number] score 0. \par \par Unclear if his symptoms related to the SVT or PVCs/NSVT - but more like related to the SVT/AT - ? multifocal\par \par Treatment options discussed: catheter ablation of AT vs medications.

## 2021-05-18 NOTE — PHYSICAL EXAM
[Normal Conjunctiva] : the conjunctiva exhibited no abnormalities [Normal Jugular Venous V Waves Present] : normal jugular venous V waves present [Heart Sounds] : normal S1 and S2 [Heart Rate And Rhythm] : heart rate and rhythm were normal [Murmurs] : no murmurs present [Arterial Pulses Normal] : the arterial pulses were normal [Edema] : no peripheral edema present [Auscultation Breath Sounds / Voice Sounds] : lungs were clear to auscultation bilaterally [Abdomen Soft] : soft [Abdomen Tenderness] : non-tender [Abnormal Walk] : normal gait [No Venous Stasis] : no venous stasis [Impaired Insight] : insight and judgment were intact [FreeTextEntry1] : trace edema

## 2021-05-18 NOTE — REVIEW OF SYSTEMS
[SOB] : shortness of breath [Dyspnea on exertion] : dyspnea during exertion [Palpitations] : palpitations [Negative] : Heme/Lymph [Chest Discomfort] : no chest discomfort [Lower Ext Edema] : no extremity edema [Leg Claudication] : no intermittent leg claudication [Orthopnea] : no orthopnea [PND] : no PND [Syncope] : no syncope

## 2021-05-18 NOTE — HISTORY OF PRESENT ILLNESS
[FreeTextEntry1] : he has no chest pain\par He has exertional shortness of breath\par He has rare palpitations\par He has no syncope\par He is neurologically intact\par He has no edema\par He has no GI symptoms\par \par \par \par \par

## 2021-05-18 NOTE — REASON FOR VISIT
[Consultation] : a consultation regarding [FreeTextEntry3] : George Zhou [FreeTextEntry1] : I saw this 60-year-old man in cath. f/u on 05/18/21.\par There are no groin access issues\par He had a Zio patch recorder placed between 2/11/2021 and 2/25/2021: It showed sinus rhythm with heart rates ranging between 46 to 176 bpm.  He had 2 runs of NSVT 4 beats in duration.  Patient had short episodes of SVT (atrial tachycardia.  The longest episode was 1 minute 33 seconds.\par \par Echocardiogram performed on 2/26/2021 showed EF 60%, mild mitral regurgitation, sclerotic aortic valve leaflet with normal opening, left atrial diameter 4.2 cm, left atrial volume index 25 cc/m², atrial septal aneurysm, moderate concentric left ventricular hypertrophy no pericardial effusion and normal pulmonary pressures.\par \par Previous nuclear stress test performed January 2019 did not show ischemia.  Nonobstructive coronaries in 2019.\par \par He has a   [id number] score 0.\par \par No HTN, DM, thyroid disease. \par Denies alcohol, excessive caffeine, nicotine. \par \par

## 2021-06-21 ENCOUNTER — NON-APPOINTMENT (OUTPATIENT)
Age: 61
End: 2021-06-21

## 2021-06-21 ENCOUNTER — APPOINTMENT (OUTPATIENT)
Dept: ELECTROPHYSIOLOGY | Facility: CLINIC | Age: 61
End: 2021-06-21
Payer: COMMERCIAL

## 2021-06-21 VITALS
SYSTOLIC BLOOD PRESSURE: 110 MMHG | HEART RATE: 46 BPM | TEMPERATURE: 97.3 F | WEIGHT: 290 LBS | DIASTOLIC BLOOD PRESSURE: 60 MMHG | HEIGHT: 71 IN | OXYGEN SATURATION: 96 % | BODY MASS INDEX: 40.6 KG/M2

## 2021-06-21 PROCEDURE — 99072 ADDL SUPL MATRL&STAF TM PHE: CPT

## 2021-06-21 PROCEDURE — 93000 ELECTROCARDIOGRAM COMPLETE: CPT

## 2021-06-21 PROCEDURE — 99214 OFFICE O/P EST MOD 30 MIN: CPT

## 2021-07-11 NOTE — PHYSICAL EXAM
[Normal Conjunctiva] : the conjunctiva exhibited no abnormalities [Normal Jugular Venous V Waves Present] : normal jugular venous V waves present [Heart Rate And Rhythm] : heart rate and rhythm were normal [Heart Sounds] : normal S1 and S2 [Murmurs] : no murmurs present [Arterial Pulses Normal] : the arterial pulses were normal [Edema] : no peripheral edema present [Auscultation Breath Sounds / Voice Sounds] : lungs were clear to auscultation bilaterally [Abdomen Soft] : soft [Abdomen Tenderness] : non-tender [Abnormal Walk] : normal gait [No Venous Stasis] : no venous stasis [Impaired Insight] : insight and judgment were intact [FreeTextEntry1] : trace edema

## 2021-07-11 NOTE — DISCUSSION/SUMMARY
[FreeTextEntry1] : Patient continues to have palpitations is not tolerating the beta-blocker well.  He has PVCs of different morphology as well as atrial arrhythmia-atrial tach/fib.  It seems that most of his symptoms from the atrial arrhythmia.\par \par I discussed the ablation procedure in detail with the patient both atrial tach/A. fib as well as the PVCs/NSVT.  I explained to him that we might not be able to trigger arrhythmia during the procedure in which case we would do a pulmonary vein isolation.  If we should find a focal atrial tachycardia we may ablate A. tach and not do PVI especially if A. fib does not inducible afterwards.\par \par The patient has symptoms suggestive of sleep apnea and we would obtain a sleep study.\par \par He would like to consider the catheter ablation procedure at this time.  I discussed the procedures including risk benefits and alternatives.  Patient will have a sleep study and then we will schedule ablation procedure afterwards.  In the meantime he will continue on current medications and try to reduce his dose of Xanax to see if he has improvement of symptoms.\par \par

## 2021-07-13 DIAGNOSIS — R40.0 SOMNOLENCE: ICD-10-CM

## 2021-07-15 ENCOUNTER — APPOINTMENT (OUTPATIENT)
Age: 61
End: 2021-07-15

## 2021-07-27 ENCOUNTER — APPOINTMENT (OUTPATIENT)
Age: 61
End: 2021-07-27
Payer: COMMERCIAL

## 2021-07-27 ENCOUNTER — OUTPATIENT (OUTPATIENT)
Dept: OUTPATIENT SERVICES | Facility: HOSPITAL | Age: 61
LOS: 1 days | End: 2021-07-27
Payer: COMMERCIAL

## 2021-07-27 DIAGNOSIS — G47.33 OBSTRUCTIVE SLEEP APNEA (ADULT) (PEDIATRIC): ICD-10-CM

## 2021-07-27 PROCEDURE — 95806 SLEEP STUDY UNATT&RESP EFFT: CPT | Mod: 26

## 2021-07-27 PROCEDURE — 95806 SLEEP STUDY UNATT&RESP EFFT: CPT

## 2021-08-06 ENCOUNTER — NON-APPOINTMENT (OUTPATIENT)
Age: 61
End: 2021-08-06

## 2021-08-21 ENCOUNTER — APPOINTMENT (OUTPATIENT)
Age: 61
End: 2021-08-21

## 2021-10-17 DIAGNOSIS — Z01.818 ENCOUNTER FOR OTHER PREPROCEDURAL EXAMINATION: ICD-10-CM

## 2021-10-18 ENCOUNTER — APPOINTMENT (OUTPATIENT)
Dept: DISASTER EMERGENCY | Facility: CLINIC | Age: 61
End: 2021-10-18

## 2021-10-19 LAB — SARS-COV-2 N GENE NPH QL NAA+PROBE: NOT DETECTED

## 2021-10-22 ENCOUNTER — OUTPATIENT (OUTPATIENT)
Dept: OUTPATIENT SERVICES | Facility: HOSPITAL | Age: 61
LOS: 1 days | End: 2021-10-22

## 2022-07-27 ENCOUNTER — APPOINTMENT (OUTPATIENT)
Dept: CARDIOLOGY | Facility: CLINIC | Age: 62
End: 2022-07-27

## 2022-07-27 VITALS — SYSTOLIC BLOOD PRESSURE: 108 MMHG | DIASTOLIC BLOOD PRESSURE: 64 MMHG

## 2022-07-27 VITALS
SYSTOLIC BLOOD PRESSURE: 132 MMHG | TEMPERATURE: 97.3 F | HEART RATE: 65 BPM | BODY MASS INDEX: 39.2 KG/M2 | HEIGHT: 71 IN | DIASTOLIC BLOOD PRESSURE: 82 MMHG | OXYGEN SATURATION: 97 % | WEIGHT: 280 LBS

## 2022-07-27 DIAGNOSIS — I48.92 UNSPECIFIED ATRIAL FLUTTER: ICD-10-CM

## 2022-07-27 DIAGNOSIS — I49.3 VENTRICULAR PREMATURE DEPOLARIZATION: ICD-10-CM

## 2022-07-27 DIAGNOSIS — I47.2 VENTRICULAR TACHYCARDIA: ICD-10-CM

## 2022-07-27 PROCEDURE — 99215 OFFICE O/P EST HI 40 MIN: CPT

## 2022-07-27 RX ORDER — FAMOTIDINE 40 MG/1
40 TABLET, FILM COATED ORAL
Qty: 90 | Refills: 0 | Status: ACTIVE | COMMUNITY
Start: 2022-05-10

## 2022-07-27 NOTE — ASSESSMENT
[FreeTextEntry1] : - Increased dyspnea on exertion, multiple CAD risk factors.  Patient will have noninvasive testing with a Lexiscan Myoview stress test to assess for obstructive CAD, echocardiogram for LVEF, wall motion, structural heart disease,  carotid and abdominal ultrasound to assess for obstructive PAD. \par \par - Abnormal Myoview stress test with nonobstructive catheterization January 2019\par \par -Dependent leg edema\par \par -Obesity.  Discussed calorie reduction and increased exercise as a weight reduction strategy.\par \par -Remote history of substance abuse.  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 \par \par Advised patient to follow active lifestyle with regular cardiovascular exercise. Patient educated on lifestyle and diet modification with low sodium low fat diet and avoidance of excessive alcohol. Patient is aware to call with any symptoms or concerns.\par \par Patient will be seen in cardiology follow-up after noninvasive testing.\par \par Kory  follow-up will follow up with Dr Jackie Harper for primary care

## 2022-07-27 NOTE — REASON FOR VISIT
[Follow-Up - Clinic] : a clinic follow-up of [FreeTextEntry1] : Kory is a 61-year-old male with history of obesity, asthma, anxiety, alcoholism quit in 1994, substance abuse cocaine quit 1984, neuropathy, mood disorder, dependant leg edema, migraine, Arnold Chiari malformation cranial surgery 2003, rotator cuff surgery 2015, hernia surgery 2003, COVID-pneumonia 7/1/2022 with residual dyspnea.  \par \par Patient has recurrent palpitations racing heartbeat once or twice per week. Patient has increased dyspnea with moderate exertion after recovering from COVID.  Patient has chronic dependant leg edema.  Cardiovascular review of symptoms is negative for exertional chest pain, dizziness or syncope.  No PND or orthopnea.  No bleeding or black stool. Patient has chronic right shoulder bursitis pain. \par \par No exercise routine.  Patient is walking less than 5 minutes on occasion.  Patient will be scheduled for pharmacologic stress test.\par \par Cardiac catheterization May 2021, LVEF 60%, nonobstructive coronaries.\par \par Cardiac catheterization January 2019, nonobstructive coronaries, no left ventriculogram\par \par Lexascan Myoview stress test January 2019, LVEF 50%, mild to moderate apical ischemic defect, transient ischemic LV dilatation seen, new a flutter/tachycardia at baseline. \par \par Holter monitor January 2019, sinus rhythm  average heart rate 67bpm, rare PACs and PVCs, atrial tachycardia lasting 15 minutes, no symptoms\par \par Echocardiogram January 2019, LVEF 65%, mild diastolic dysfunction, normal PASP, aneurysmal interatrial septum\par \par Carotid and abdominal ultrasound January 2019, nonobstructive torturous carotid arteries, normal abdominal aortic size\par \par EKG December 2018, sinus rhythm, PACs, PRWP\par  [FreeTextEntry2] : palpitations,  atrial tachycardia, abnormal myoview stess test with nonobstructive cath Jan 2019

## 2022-08-04 ENCOUNTER — RX CHANGE (OUTPATIENT)
Age: 62
End: 2022-08-04

## 2022-08-08 ENCOUNTER — RX CHANGE (OUTPATIENT)
Age: 62
End: 2022-08-08

## 2022-08-22 ENCOUNTER — APPOINTMENT (OUTPATIENT)
Dept: CARDIOLOGY | Facility: CLINIC | Age: 62
End: 2022-08-22

## 2022-08-22 PROCEDURE — 93880 EXTRACRANIAL BILAT STUDY: CPT

## 2022-08-22 PROCEDURE — 93979 VASCULAR STUDY: CPT

## 2022-08-22 PROCEDURE — 93306 TTE W/DOPPLER COMPLETE: CPT

## 2022-08-29 ENCOUNTER — APPOINTMENT (OUTPATIENT)
Dept: CARDIOLOGY | Facility: CLINIC | Age: 62
End: 2022-08-29

## 2022-08-29 PROCEDURE — A9502: CPT

## 2022-08-29 PROCEDURE — 78452 HT MUSCLE IMAGE SPECT MULT: CPT

## 2022-08-29 PROCEDURE — 93015 CV STRESS TEST SUPVJ I&R: CPT

## 2022-08-30 ENCOUNTER — APPOINTMENT (OUTPATIENT)
Dept: CARDIOLOGY | Facility: CLINIC | Age: 62
End: 2022-08-30

## 2022-08-30 DIAGNOSIS — R07.9 CHEST PAIN, UNSPECIFIED: ICD-10-CM

## 2022-08-30 DIAGNOSIS — I47.1 SUPRAVENTRICULAR TACHYCARDIA: ICD-10-CM

## 2022-08-30 PROCEDURE — 99442: CPT

## 2022-08-30 NOTE — REVIEW OF SYSTEMS
[Negative] : Heme/Lymph [SOB] : shortness of breath [Dyspnea on exertion] : dyspnea during exertion [Chest Discomfort] : chest discomfort [Lower Ext Edema] : lower extremity edema [Leg Claudication] : intermittent leg claudication [Palpitations] : palpitations

## 2022-08-30 NOTE — DISCUSSION/SUMMARY
[FreeTextEntry1] : AURORA RUANO is a 61 year old M who I called today Aug 30, 2022 with the above history and the following active issues:\par \par Chest pain. SMITH. Palpitations/tachycardia. Dizziness. Chronic edema. Calf pain. Neuropathy. Mult CAD risk factors. Abn nuke.\par Recommend right and left heart cath.\par Above testing has been reviewed with the patient. \par High risk noninvasive imaging. \par Discussed risk of major adverse cardiovascular events without further evaluation and management. \par Recommend cardiac catheterization.\par Discussed the risks, benefits, alternatives of invasive angiography.\par Discussed potential for revascularization, percutaneous v surgical\par All questions answered.\par If escalating sx or sx at rest then 911\par Daily aspirin therapy. \par Beta blocker therapy. \par \par AT: Recommend 7 day Zio.\par \par Recommend ABIs.\par \par -Dependent leg edema\par \par -Obesity. 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 \par \par Continue ASA, statin, betablocker.\par \par Abd aortic aneurysm measuring 3 cm. Repeat in 6 months.\par \par Ongoing f/u with PCP.\par \par F/U after testing to review results.\par Discussed red flag symptoms, which would warrant sooner or emergent medical evaluation.\par Any questions and concerns were addressed and resolved.\par \par Sincerely,\par Lucía Narayan [facility]\par Patient's history, testing, and plan was reviewed with supervising physician, Dr. Patrick Valentino

## 2022-08-30 NOTE — HISTORY OF PRESENT ILLNESS
[FreeTextEntry1] : AURORA RUANO is a 61 year old male with a past medical history of obesity, asthma, anxiety, alcoholism quit in 1994, substance abuse cocaine quit 1984, neuropathy, mood disorder, dependant leg edema, AT,  migraine, Arnold Chiari malformation cranial surgery 2003, rotator cuff surgery 2015, hernia surgery 2003, COVID-pneumonia 7/1/2022.\par \par Last seen 7/27/22. I called him today to go over results of his testing as he was anxious for his results. Nuke, echo, carotid, abd ordered at last OV. See details below. Patient reports SMITH and tightness in his chest at times. Nonradiating. Notices dizziness at times. Notices occasional tachycardia. Reports chronic edema. Reports occasional calf pain. Reports neuropathy. Denies syncope.\par \par Testing:\par \par Nuke 8/29/22: Lexiscan. Negative for pharmacologically induced ischemia by EKG. Rare PACs. Seq PACs. Occasional PVCs. Small defect in apical inferior wall that is predominantly fixed, suggestive of infarction with minimal pearl infarct ischemia. EF 49%. Enlarged LV size. Dilated CM noted. \par \par Echo 8/22/22: EF 60-65%. Mild MR. Mild AR. Moderately dilated LA. Mild concentric LVH. Normal wall motion. NATHALY. Mild TR. Minimal CO.\par \par Abd u/s 8/22/22: AAA noted measuring 3 cm x 3 cm at its largest diameter in the prox segment. Mild plaque. \par \par Carotid u/s 8/22/22: Mild nonobstructive carotid dz seen BL.\par \par \par Labs 5/10/22: TSH 1.21, A1C 6.3, Hgb 13.7, HCT 38.3, plt 199, WBC 6.2, ALT 15, AST 16, Ca 8.6, Cr 0.65, K 4.4, Na 140, Chol 146, HDl 48, LDL 84, Trigs 71\par \par Cardiac catheterization May 2021, LVEF 60%, nonobstructive coronaries.\par \par Cardiac catheterization January 2019, nonobstructive coronaries, no left ventriculogram\par \par Lexascan Myoview stress test January 2019, LVEF 50%, mild to moderate apical ischemic defect, transient ischemic LV dilatation seen, new a flutter/tachycardia at baseline. \par \par Holter 7/16/19: SR with intermittent AT. Rates  bpm, average HR 67 bpm. PACs were rare, PVCs were multifocal and rare.\par \par Holter monitor January 2019, sinus rhythm  average heart rate 67bpm, rare PACs and PVCs, atrial tachycardia lasting 15 minutes, no symptoms\par \par \par

## 2022-09-01 ENCOUNTER — NON-APPOINTMENT (OUTPATIENT)
Age: 62
End: 2022-09-01

## 2022-09-12 ENCOUNTER — APPOINTMENT (OUTPATIENT)
Dept: CARDIOLOGY | Facility: CLINIC | Age: 62
End: 2022-09-12

## 2022-09-12 LAB
ALBUMIN SERPL ELPH-MCNC: 4.3 G/DL
ALP BLD-CCNC: 56 U/L
ALT SERPL-CCNC: 10 U/L
ANION GAP SERPL CALC-SCNC: 7 MMOL/L
AST SERPL-CCNC: 13 U/L
BASOPHILS # BLD AUTO: 0.07 K/UL
BASOPHILS NFR BLD AUTO: 1.1 %
BILIRUB SERPL-MCNC: 0.5 MG/DL
BUN SERPL-MCNC: 21 MG/DL
CALCIUM SERPL-MCNC: 9.1 MG/DL
CHLORIDE SERPL-SCNC: 107 MMOL/L
CHOLEST SERPL-MCNC: 173 MG/DL
CK SERPL-CCNC: 70 U/L
CO2 SERPL-SCNC: 28 MMOL/L
CREAT SERPL-MCNC: 0.82 MG/DL
EGFR: 99 ML/MIN/1.73M2
EOSINOPHIL # BLD AUTO: 0.24 K/UL
EOSINOPHIL NFR BLD AUTO: 3.6 %
GLUCOSE SERPL-MCNC: 128 MG/DL
HCT VFR BLD CALC: 43.4 %
HDLC SERPL-MCNC: 52 MG/DL
HGB BLD-MCNC: 14 G/DL
IMM GRANULOCYTES NFR BLD AUTO: 0.3 %
LDLC SERPL CALC-MCNC: 105 MG/DL
LYMPHOCYTES # BLD AUTO: 1.92 K/UL
LYMPHOCYTES NFR BLD AUTO: 28.8 %
MAN DIFF?: NORMAL
MCHC RBC-ENTMCNC: 31.4 PG
MCHC RBC-ENTMCNC: 32.3 GM/DL
MCV RBC AUTO: 97.3 FL
MONOCYTES # BLD AUTO: 0.69 K/UL
MONOCYTES NFR BLD AUTO: 10.4 %
NEUTROPHILS # BLD AUTO: 3.72 K/UL
NEUTROPHILS NFR BLD AUTO: 55.8 %
NONHDLC SERPL-MCNC: 121 MG/DL
PLATELET # BLD AUTO: 210 K/UL
POTASSIUM SERPL-SCNC: 4.9 MMOL/L
PROT SERPL-MCNC: 6.6 G/DL
RBC # BLD: 4.46 M/UL
RBC # FLD: 13.1 %
SODIUM SERPL-SCNC: 143 MMOL/L
TRIGL SERPL-MCNC: 80 MG/DL
WBC # FLD AUTO: 6.66 K/UL

## 2022-09-13 LAB — SARS-COV-2 N GENE NPH QL NAA+PROBE: NOT DETECTED

## 2022-10-04 ENCOUNTER — APPOINTMENT (OUTPATIENT)
Dept: CARDIOLOGY | Facility: CLINIC | Age: 62
End: 2022-10-04

## 2022-10-04 VITALS
HEART RATE: 61 BPM | OXYGEN SATURATION: 96 % | HEIGHT: 71 IN | SYSTOLIC BLOOD PRESSURE: 122 MMHG | BODY MASS INDEX: 41.02 KG/M2 | TEMPERATURE: 97.3 F | DIASTOLIC BLOOD PRESSURE: 64 MMHG | WEIGHT: 293 LBS

## 2022-10-04 DIAGNOSIS — R94.39 ABNORMAL RESULT OF OTHER CARDIOVASCULAR FUNCTION STUDY: ICD-10-CM

## 2022-10-04 PROCEDURE — 99215 OFFICE O/P EST HI 40 MIN: CPT

## 2022-10-04 RX ORDER — METHOCARBAMOL 500 MG/1
500 TABLET, FILM COATED ORAL
Qty: 30 | Refills: 0 | Status: DISCONTINUED | COMMUNITY
Start: 2022-03-25 | End: 2022-10-04

## 2022-10-04 RX ORDER — LURASIDONE HYDROCHLORIDE 20 MG/1
20 TABLET, FILM COATED ORAL
Qty: 30 | Refills: 0 | Status: DISCONTINUED | COMMUNITY
Start: 2022-04-20 | End: 2022-10-04

## 2022-10-04 NOTE — DISCUSSION/SUMMARY
[FreeTextEntry1] : \par \par The patient has had a long history of palpitations.  His Zio patch monitor shows PVCs, short runs of NSVT and disorganized atrial rhythm atrial tach/fib.\par PVCs: different morphologies - Right bundle superior axis\par NSVT: 2 episodes 4 beat 160 bpm: Echo EF 60%. Nonobstructive coronaries in 2019. Recommend Beta blocker therapy.\par \par SVT: Appears to a disorganized AT - can degenerate to AF.  [id number] score 0. \par \par Unclear if his symptoms related to the SVT or PVCs/NSVT - but more like related to the SVT/AT - ? multifocal\par \par Treatment options discussed: catheter ablation of AT vs medications.\par \par He had a repeat cath 2 weeks ago which again showed normal coronary arteries.  Abdominal ultrasound showed a AAA of 3 cm.\par He will follow-up in 6 months and repeat the ultrasound next summer

## 2022-10-04 NOTE — REASON FOR VISIT
[Consultation] : a consultation regarding [FreeTextEntry3] : George Zhou [FreeTextEntry1] : I saw this 61-year-old man in cath. f/u on 10/04/22.  He had a normal cardiac cath again on 09/14/22\par There are no groin access issues.  The cath was once again normal.\par He had a Zio patch recorder placed between 2/11/2021 and 2/25/2021: It showed sinus rhythm with heart rates ranging between 46 to 176 bpm.  He had 2 runs of NSVT 4 beats in duration.  Patient had short episodes of SVT (atrial tachycardia.  The longest episode was 1 minute 33 seconds.\par \par Echocardiogram performed on 2/26/2021 showed EF 60%, mild mitral regurgitation, sclerotic aortic valve leaflet with normal opening, left atrial diameter 4.2 cm, left atrial volume index 25 cc/m², atrial septal aneurysm, moderate concentric left ventricular hypertrophy no pericardial effusion and normal pulmonary pressures.\par \par Previous nuclear stress test performed January 2019 did not show ischemia.  Nonobstructive coronaries in 2019.\par \par He has a   [id number] score 0.\par \par No HTN, DM, thyroid disease. \par Denies alcohol, excessive caffeine, nicotine. \par Abdominal ultrasound shows a 3 cm AAA\par \par

## 2022-10-25 ENCOUNTER — APPOINTMENT (OUTPATIENT)
Dept: RADIOLOGY | Facility: CLINIC | Age: 62
End: 2022-10-25

## 2022-10-25 PROCEDURE — 71046 X-RAY EXAM CHEST 2 VIEWS: CPT

## 2022-11-01 ENCOUNTER — NON-APPOINTMENT (OUTPATIENT)
Age: 62
End: 2022-11-01

## 2023-10-30 ENCOUNTER — RESULT CHARGE (OUTPATIENT)
Age: 63
End: 2023-10-30

## 2023-10-31 ENCOUNTER — APPOINTMENT (OUTPATIENT)
Dept: CARDIOLOGY | Facility: CLINIC | Age: 63
End: 2023-10-31
Payer: COMMERCIAL

## 2023-10-31 ENCOUNTER — NON-APPOINTMENT (OUTPATIENT)
Age: 63
End: 2023-10-31

## 2023-10-31 VITALS
BODY MASS INDEX: 40.6 KG/M2 | HEIGHT: 71 IN | RESPIRATION RATE: 14 BRPM | WEIGHT: 290 LBS | OXYGEN SATURATION: 98 % | HEART RATE: 57 BPM | SYSTOLIC BLOOD PRESSURE: 130 MMHG | DIASTOLIC BLOOD PRESSURE: 66 MMHG

## 2023-10-31 DIAGNOSIS — R06.09 OTHER FORMS OF DYSPNEA: ICD-10-CM

## 2023-10-31 DIAGNOSIS — R94.31 ABNORMAL ELECTROCARDIOGRAM [ECG] [EKG]: ICD-10-CM

## 2023-10-31 DIAGNOSIS — R60.0 LOCALIZED EDEMA: ICD-10-CM

## 2023-10-31 PROCEDURE — 99214 OFFICE O/P EST MOD 30 MIN: CPT

## 2023-10-31 RX ORDER — IBUPROFEN 800 MG/1
800 TABLET ORAL
Refills: 0 | Status: ACTIVE | COMMUNITY
Start: 2022-03-11

## 2023-11-01 ENCOUNTER — APPOINTMENT (OUTPATIENT)
Dept: CARDIOLOGY | Facility: CLINIC | Age: 63
End: 2023-11-01
Payer: MEDICARE

## 2023-11-01 PROCEDURE — 93306 TTE W/DOPPLER COMPLETE: CPT

## 2023-11-01 PROCEDURE — 93242 EXT ECG>48HR<7D RECORDING: CPT

## 2023-11-10 ENCOUNTER — APPOINTMENT (OUTPATIENT)
Dept: CARDIOLOGY | Facility: CLINIC | Age: 63
End: 2023-11-10
Payer: MEDICARE

## 2023-11-10 PROCEDURE — 93242 EXT ECG>48HR<7D RECORDING: CPT

## 2023-11-14 RX ORDER — METOPROLOL SUCCINATE 25 MG/1
25 TABLET, EXTENDED RELEASE ORAL
Qty: 180 | Refills: 3 | Status: ACTIVE | COMMUNITY
Start: 2019-01-08 | End: 1900-01-01

## 2023-11-14 RX ORDER — ATORVASTATIN CALCIUM 40 MG/1
40 TABLET, FILM COATED ORAL
Qty: 90 | Refills: 3 | Status: ACTIVE | COMMUNITY
Start: 2019-01-09 | End: 1900-01-01

## 2023-11-28 PROCEDURE — 93244 EXT ECG>48HR<7D REV&INTERPJ: CPT

## 2023-11-30 PROCEDURE — 93244 EXT ECG>48HR<7D REV&INTERPJ: CPT

## 2023-12-15 ENCOUNTER — APPOINTMENT (OUTPATIENT)
Dept: CARDIOLOGY | Facility: CLINIC | Age: 63
End: 2023-12-15

## 2024-01-02 ENCOUNTER — APPOINTMENT (OUTPATIENT)
Dept: CARDIOLOGY | Facility: CLINIC | Age: 64
End: 2024-01-02
Payer: MEDICARE

## 2024-01-02 PROCEDURE — 93979 VASCULAR STUDY: CPT

## 2024-01-23 ENCOUNTER — NON-APPOINTMENT (OUTPATIENT)
Age: 64
End: 2024-01-23

## 2024-01-23 ENCOUNTER — APPOINTMENT (OUTPATIENT)
Dept: CARDIOLOGY | Facility: CLINIC | Age: 64
End: 2024-01-23
Payer: MEDICARE

## 2024-01-23 VITALS
OXYGEN SATURATION: 97 % | HEIGHT: 71 IN | HEART RATE: 60 BPM | DIASTOLIC BLOOD PRESSURE: 60 MMHG | SYSTOLIC BLOOD PRESSURE: 118 MMHG | WEIGHT: 280 LBS | BODY MASS INDEX: 39.2 KG/M2

## 2024-01-23 DIAGNOSIS — E78.00 PURE HYPERCHOLESTEROLEMIA, UNSPECIFIED: ICD-10-CM

## 2024-01-23 DIAGNOSIS — Z01.810 ENCOUNTER FOR PREPROCEDURAL CARDIOVASCULAR EXAMINATION: ICD-10-CM

## 2024-01-23 DIAGNOSIS — I71.40 ABDOMINAL AORTIC ANEURYSM, WITHOUT RUPTURE, UNSPECIFIED: ICD-10-CM

## 2024-01-23 PROCEDURE — 99214 OFFICE O/P EST MOD 30 MIN: CPT

## 2024-01-23 PROCEDURE — 93000 ELECTROCARDIOGRAM COMPLETE: CPT

## 2024-01-23 RX ORDER — GABAPENTIN 600 MG/1
600 TABLET, FILM COATED ORAL DAILY
Refills: 0 | Status: DISCONTINUED | COMMUNITY
End: 2024-01-23

## 2024-01-23 RX ORDER — DULOXETINE HYDROCHLORIDE 60 MG/1
60 CAPSULE, DELAYED RELEASE ORAL
Refills: 0 | Status: ACTIVE | COMMUNITY

## 2024-01-23 RX ORDER — TADALAFIL 2.5 MG/1
2.5 TABLET, FILM COATED ORAL
Qty: 90 | Refills: 0 | Status: DISCONTINUED | COMMUNITY
Start: 2022-05-10 | End: 2024-01-23

## 2024-01-23 NOTE — HISTORY OF PRESENT ILLNESS
[FreeTextEntry1] : AURORA RUANO is a 63 year old male with a past medical history of obesity, asthma, anxiety, alcoholism quit in 1994, substance abuse cocaine quit 1984, neuropathy, mood disorder, dependant leg edema, AT, NSVT, migraine, Arnold Chiari malformation cranial surgery 2003, rotator cuff surgery 2015, hernia surgery 2003, COVID-pneumonia 7/1/2022. Mild KLEVER. Prior vascular procedure patient does not know what procedure what done or who the vascular drStacia is.  Presents to review testing and for preop clearance prior to [place] [facility] [place].  Denies CP, palpiations, syncope. Former smoker. Not on a formal exercise regimen. Walks dog.  Testing: EKG 1/23/2024.  Sinus bradycardia. Echocardiogram 11/1/2023.  EF: 60%.  Normal diastolic function.  Mild LVH.  Aortic root: 4.1 cm.  Mild MR and TR.  Trace AR and PI.  No pericardial effusion.  Stable. Abdominal aortic ultrasound.  1/2/2024.  Small fusiform abdominal aortic dilatation measuring 3 cm.  Mild nonobstructive atherosclerosis.  Stable. 1 week event recorder.  Sinus rhythm to sinus bradycardia.  Average heart rate 58 bpm.  Rare atrial and ventricular ectopy.  EKG 10/31/23: SB at 56 bpm, WI interval 218ms, QTc 432 ms, PRWP, nonspecific ST-T wave abnormalities   Labs 8/2023: Chol 172, HDL 62, LDL 90, Trigs 114, TSH 1.08, A1C 6.5, WBC 9.47, Hgb 14.2, HCT 42.6, plt 223, Na 140, K 4.1, AST 13, Ca 9.4, Cr 0.88, ALT 14.  Cath 9/2022: LM: minor luminal irreg. LAD: minor luminal irreg. Cx: Minor luminal irreg. RCA: Minor luminal irreg.  Nuke 8/29/22: Lexiscan. Negative for pharmacologically induced ischemia by EKG. Rare PACs. Seq PACs. Occasional PVCs. Small defect in apical inferior wall that is predominantly fixed, suggestive of infarction with minimal pearl infarct ischemia. EF 49%. Enlarged LV size. Dilated CM noted.   Echo 8/22/22: EF 60-65%. Mild MR. Mild AR. Moderately dilated LA. Mild concentric LVH. Normal wall motion. NATHALY. Mild TR. Minimal WI.  Abd u/s 8/22/22: AAA noted measuring 3 cm x 3 cm at its largest diameter in the prox segment. Mild plaque.   Carotid u/s 8/22/22: Mild nonobstructive carotid dz seen BL.   Labs 5/10/22: TSH 1.21, A1C 6.3, Hgb 13.7, HCT 38.3, plt 199, WBC 6.2, ALT 15, AST 16, Ca 8.6, Cr 0.65, K 4.4, Na 140, Chol 146, HDl 48, LDL 84, Trigs 71  Cardiac catheterization May 2021, LVEF 60%, nonobstructive coronaries.  Cardiac catheterization January 2019, nonobstructive coronaries, no left ventriculogram  Lexascan Myoview stress test January 2019, LVEF 50%, mild to moderate apical ischemic defect, transient ischemic LV dilatation seen, new a flutter/tachycardia at baseline.   Holter 7/16/19: SR with intermittent AT. Rates  bpm, average HR 67 bpm. PACs were rare, PVCs were multifocal and rare.  Holter monitor January 2019, sinus rhythm  average heart rate 67bpm, rare PACs and PVCs, atrial tachycardia lasting 15 minutes, no symptoms.

## 2024-01-23 NOTE — DISCUSSION/SUMMARY
[FreeTextEntry1] : AURORA RUANO is a 63 year old M who presents with the above history and the following active issues:  AT, NSVT: Has seen EP in the past.  No significant arrhythmia on most recent event recorder.  Continue current meds.  obesity: Heart healthy diet and lifestyle.  KLEVER: Advised to f/u with Pulm.  asthma: F/u with PCP.  anxiety, alcoholism quit in 1994, substance abuse cocaine quit 1984  neuropathy: F/u with PCP.  mood disorder: F/u with PCP.  migraine: F/u with PCP.  Abd aortic aneurysm measuring 3 cm.  Stable.  Atherosclerosis noted.    Continue ASA, statin, betablocker.  At present, there are no active cardiac conditions.  No recent unstable coronary syndromes, decompensated heart failure, severe valvular heart disease or significant dysrhythmias.  Baseline functional status is acceptable.  The clinical benefit of the proposed procedure outweighs the associated cardiovascular risk.  Risk not attenuated with further CV testing.  Prior testing as outlined above. Optimized from a cardiovascular perspective. May hold ASA 5-7 days if needed.   Ongoing f/u with PCP. [EKG obtained to assist in diagnosis and management of assessed problem(s)] : EKG obtained to assist in diagnosis and management of assessed problem(s)

## 2024-01-23 NOTE — PHYSICAL EXAM

## 2024-07-29 ENCOUNTER — APPOINTMENT (OUTPATIENT)
Dept: CARDIOLOGY | Facility: CLINIC | Age: 64
End: 2024-07-29
Payer: MEDICARE

## 2024-07-29 VITALS
OXYGEN SATURATION: 97 % | SYSTOLIC BLOOD PRESSURE: 104 MMHG | WEIGHT: 270 LBS | BODY MASS INDEX: 37.8 KG/M2 | HEART RATE: 66 BPM | DIASTOLIC BLOOD PRESSURE: 50 MMHG | HEIGHT: 71 IN

## 2024-07-29 DIAGNOSIS — E66.8 OTHER OBESITY: ICD-10-CM

## 2024-07-29 DIAGNOSIS — R60.0 LOCALIZED EDEMA: ICD-10-CM

## 2024-07-29 DIAGNOSIS — E78.00 PURE HYPERCHOLESTEROLEMIA, UNSPECIFIED: ICD-10-CM

## 2024-07-29 DIAGNOSIS — R94.39 ABNORMAL RESULT OF OTHER CARDIOVASCULAR FUNCTION STUDY: ICD-10-CM

## 2024-07-29 DIAGNOSIS — R06.09 OTHER FORMS OF DYSPNEA: ICD-10-CM

## 2024-07-29 DIAGNOSIS — I48.92 UNSPECIFIED ATRIAL FLUTTER: ICD-10-CM

## 2024-07-29 PROCEDURE — 99215 OFFICE O/P EST HI 40 MIN: CPT

## 2024-07-29 PROCEDURE — G2211 COMPLEX E/M VISIT ADD ON: CPT

## 2024-07-29 RX ORDER — MULTIVIT-MIN/IRON/FOLIC ACID/K 18-600-40
50 MCG CAPSULE ORAL
Refills: 0 | Status: ACTIVE | COMMUNITY

## 2024-07-29 RX ORDER — TAMSULOSIN HYDROCHLORIDE 0.4 MG/1
0.4 CAPSULE ORAL AT BEDTIME
Refills: 0 | Status: ACTIVE | COMMUNITY

## 2024-07-29 NOTE — ASSESSMENT
[FreeTextEntry1] : Patient has medical history detailed above and active medical issues including:  - Atypical chest pain resolved, nonobstructive catheterization 2021 and 2019  - Abnormal Myoview stress test with nonobstructive catheterization January 2019  -Dependent leg edema  -Obesity.  Discussed calorie reduction and increased exercise as a weight reduction strategy.  -Remote history of substance abuse.  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   Advised patient to follow active lifestyle with regular cardiovascular exercise. Patient educated on lifestyle and diet modification with low sodium low fat diet and avoidance of excessive alcohol. Patient is aware to call with any symptoms or concerns.  Patient will be seen in cardiology follow-up 6 months with echocardiogram.  Kory  follow-up will follow up with Dr Jackie Harper for primary care  Total time spent 45 minutes, reviewing of test results, chart information, patient discussion, physical exam and completion of chart documentation.

## 2024-07-29 NOTE — REASON FOR VISIT
[Follow-Up - Clinic] : a clinic follow-up of [FreeTextEntry1] : Kory has a past medical history of obesity, asthma, anxiety, alcoholism quit in 1994, substance abuse cocaine quit 1984, neuropathy, mood disorder, dependant leg edema, migraine, Arnold Chiari malformation cranial surgery 2003, rotator cuff surgery 2015, hernia surgery 2003, COVID-pneumonia 7/1/2022 with residual dyspnea.    Cardiovascular review of symptoms is negative for exertional chest pain, dyspnea, palpitations, dizziness or syncope.  No PND or orthopnea leg edema.  No bleeding or black stool.  Patient has chronic right shoulder bursitis pain.   No exercise routine.  Patient is walking 30 minutes on occasion.   Cardiac catheterization May 2021, LVEF 60%, nonobstructive coronaries.  Cardiac catheterization January 2019, nonobstructive coronaries, no left ventriculogram  Lexascan Myoview stress test January 2019, LVEF 50%, mild to moderate apical ischemic defect, transient ischemic LV dilatation seen, new a flutter/tachycardia at baseline.   Holter monitor January 2019, sinus rhythm  average heart rate 67bpm, rare PACs and PVCs, atrial tachycardia lasting 15 minutes, no symptoms  Echocardiogram January 2019, LVEF 65%, mild diastolic dysfunction, normal PASP, aneurysmal interatrial septum  Carotid and abdominal ultrasound January 2019, nonobstructive torturous carotid arteries, normal abdominal aortic size  EKG December 2018, sinus rhythm, PACs, PRWP  [FreeTextEntry2] : palpitations,  atrial tachycardia, abnormal myoview stess test with nonobstructive cath Jan 2019

## 2024-08-22 ENCOUNTER — NON-APPOINTMENT (OUTPATIENT)
Age: 64
End: 2024-08-22

## 2024-09-06 ENCOUNTER — OUTPATIENT (OUTPATIENT)
Dept: OUTPATIENT SERVICES | Facility: HOSPITAL | Age: 64
LOS: 1 days | End: 2024-09-06
Payer: MEDICARE

## 2024-09-06 DIAGNOSIS — G47.33 OBSTRUCTIVE SLEEP APNEA (ADULT) (PEDIATRIC): ICD-10-CM

## 2024-09-06 PROCEDURE — 95800 SLP STDY UNATTENDED: CPT

## 2024-09-06 PROCEDURE — G0400: CPT | Mod: 26

## 2024-11-12 ENCOUNTER — APPOINTMENT (OUTPATIENT)
Dept: CARDIOLOGY | Facility: CLINIC | Age: 64
End: 2024-11-12
Payer: MEDICARE

## 2024-11-12 VITALS
WEIGHT: 270 LBS | SYSTOLIC BLOOD PRESSURE: 108 MMHG | OXYGEN SATURATION: 98 % | HEART RATE: 57 BPM | DIASTOLIC BLOOD PRESSURE: 62 MMHG | HEIGHT: 71 IN | BODY MASS INDEX: 37.8 KG/M2

## 2024-11-12 DIAGNOSIS — I71.40 ABDOMINAL AORTIC ANEURYSM, WITHOUT RUPTURE, UNSPECIFIED: ICD-10-CM

## 2024-11-12 DIAGNOSIS — R94.39 ABNORMAL RESULT OF OTHER CARDIOVASCULAR FUNCTION STUDY: ICD-10-CM

## 2024-11-12 DIAGNOSIS — R06.09 OTHER FORMS OF DYSPNEA: ICD-10-CM

## 2024-11-12 DIAGNOSIS — I47.19 OTHER SUPRAVENTRICULAR TACHYCARDIA: ICD-10-CM

## 2024-11-12 DIAGNOSIS — R07.9 CHEST PAIN, UNSPECIFIED: ICD-10-CM

## 2024-11-12 DIAGNOSIS — R94.31 ABNORMAL ELECTROCARDIOGRAM [ECG] [EKG]: ICD-10-CM

## 2024-11-12 DIAGNOSIS — I49.3 VENTRICULAR PREMATURE DEPOLARIZATION: ICD-10-CM

## 2024-11-12 DIAGNOSIS — E78.00 PURE HYPERCHOLESTEROLEMIA, UNSPECIFIED: ICD-10-CM

## 2024-11-12 DIAGNOSIS — Z01.810 ENCOUNTER FOR PREPROCEDURAL CARDIOVASCULAR EXAMINATION: ICD-10-CM

## 2024-11-12 DIAGNOSIS — I48.92 UNSPECIFIED ATRIAL FLUTTER: ICD-10-CM

## 2024-11-12 DIAGNOSIS — E66.9 OBESITY, UNSPECIFIED: ICD-10-CM

## 2024-11-12 DIAGNOSIS — R60.0 LOCALIZED EDEMA: ICD-10-CM

## 2024-11-12 DIAGNOSIS — R40.0 SOMNOLENCE: ICD-10-CM

## 2024-11-12 PROCEDURE — 99215 OFFICE O/P EST HI 40 MIN: CPT

## 2024-11-12 PROCEDURE — G2211 COMPLEX E/M VISIT ADD ON: CPT

## 2024-11-12 RX ORDER — FLUTICASONE FUROATE, UMECLIDINIUM BROMIDE AND VILANTEROL TRIFENATATE 100; 62.5; 25 UG/1; UG/1; UG/1
100-62.5-25 POWDER RESPIRATORY (INHALATION)
Refills: 0 | Status: ACTIVE | COMMUNITY

## 2024-11-26 ENCOUNTER — NON-APPOINTMENT (OUTPATIENT)
Age: 64
End: 2024-11-26

## 2025-05-12 PROBLEM — I47.29 NSVT (NONSUSTAINED VENTRICULAR TACHYCARDIA): Status: ACTIVE | Noted: 2021-03-23

## 2025-05-13 ENCOUNTER — APPOINTMENT (OUTPATIENT)
Dept: CARDIOLOGY | Facility: CLINIC | Age: 65
End: 2025-05-13
Payer: MEDICARE

## 2025-05-13 ENCOUNTER — NON-APPOINTMENT (OUTPATIENT)
Age: 65
End: 2025-05-13

## 2025-05-13 PROCEDURE — 93306 TTE W/DOPPLER COMPLETE: CPT

## 2025-05-19 ENCOUNTER — NON-APPOINTMENT (OUTPATIENT)
Age: 65
End: 2025-05-19

## 2025-05-19 ENCOUNTER — APPOINTMENT (OUTPATIENT)
Dept: CARDIOLOGY | Facility: CLINIC | Age: 65
End: 2025-05-19
Payer: MEDICARE

## 2025-05-19 VITALS
OXYGEN SATURATION: 97 % | DIASTOLIC BLOOD PRESSURE: 62 MMHG | WEIGHT: 270 LBS | HEIGHT: 71 IN | HEART RATE: 67 BPM | BODY MASS INDEX: 37.8 KG/M2 | SYSTOLIC BLOOD PRESSURE: 114 MMHG

## 2025-05-19 DIAGNOSIS — R94.39 ABNORMAL RESULT OF OTHER CARDIOVASCULAR FUNCTION STUDY: ICD-10-CM

## 2025-05-19 DIAGNOSIS — R60.0 LOCALIZED EDEMA: ICD-10-CM

## 2025-05-19 DIAGNOSIS — I71.40 ABDOMINAL AORTIC ANEURYSM, WITHOUT RUPTURE, UNSPECIFIED: ICD-10-CM

## 2025-05-19 DIAGNOSIS — I49.3 VENTRICULAR PREMATURE DEPOLARIZATION: ICD-10-CM

## 2025-05-19 DIAGNOSIS — E66.9 OBESITY, UNSPECIFIED: ICD-10-CM

## 2025-05-19 DIAGNOSIS — I71.21 ANEURYSM OF THE ASCENDING AORTA, WITHOUT RUPTURE: ICD-10-CM

## 2025-05-19 DIAGNOSIS — R06.09 OTHER FORMS OF DYSPNEA: ICD-10-CM

## 2025-05-19 DIAGNOSIS — R07.9 CHEST PAIN, UNSPECIFIED: ICD-10-CM

## 2025-05-19 DIAGNOSIS — I47.19 OTHER SUPRAVENTRICULAR TACHYCARDIA: ICD-10-CM

## 2025-05-19 DIAGNOSIS — I47.29 OTHER VENTRICULAR TACHYCARDIA: ICD-10-CM

## 2025-05-19 PROCEDURE — 99215 OFFICE O/P EST HI 40 MIN: CPT

## 2025-07-02 LAB
APO LP(A) SERPL-MCNC: 98
HBA1C MFR BLD HPLC: 6

## 2025-07-31 ENCOUNTER — RX RENEWAL (OUTPATIENT)
Age: 65
End: 2025-07-31

## 2025-09-03 ENCOUNTER — APPOINTMENT (OUTPATIENT)
Dept: CARDIOLOGY | Facility: CLINIC | Age: 65
End: 2025-09-03
Payer: MEDICARE

## 2025-09-03 VITALS
HEIGHT: 71 IN | DIASTOLIC BLOOD PRESSURE: 60 MMHG | BODY MASS INDEX: 37.8 KG/M2 | HEART RATE: 50 BPM | SYSTOLIC BLOOD PRESSURE: 110 MMHG | OXYGEN SATURATION: 96 % | WEIGHT: 270 LBS

## 2025-09-03 DIAGNOSIS — I71.40 ABDOMINAL AORTIC ANEURYSM, WITHOUT RUPTURE, UNSPECIFIED: ICD-10-CM

## 2025-09-03 DIAGNOSIS — I25.10 ATHEROSCLEROTIC HEART DISEASE OF NATIVE CORONARY ARTERY W/OUT ANGINA PECTORIS: ICD-10-CM

## 2025-09-03 DIAGNOSIS — R94.31 ABNORMAL ELECTROCARDIOGRAM [ECG] [EKG]: ICD-10-CM

## 2025-09-03 DIAGNOSIS — I07.1 RHEUMATIC TRICUSPID INSUFFICIENCY: ICD-10-CM

## 2025-09-03 PROCEDURE — G2211 COMPLEX E/M VISIT ADD ON: CPT

## 2025-09-03 PROCEDURE — 99214 OFFICE O/P EST MOD 30 MIN: CPT

## 2025-09-03 RX ORDER — DULOXETINE HCL 30 MG
30 CAPSULE,DELAYED RELEASE (ENTERIC COATED) ORAL
Refills: 0 | Status: ACTIVE | COMMUNITY